# Patient Record
Sex: FEMALE | Race: WHITE | NOT HISPANIC OR LATINO | Employment: UNEMPLOYED | URBAN - METROPOLITAN AREA
[De-identification: names, ages, dates, MRNs, and addresses within clinical notes are randomized per-mention and may not be internally consistent; named-entity substitution may affect disease eponyms.]

---

## 2025-01-11 ENCOUNTER — HOSPITAL ENCOUNTER (OUTPATIENT)
Dept: RADIOLOGY | Facility: HOSPITAL | Age: 40
Discharge: HOME | End: 2025-01-11
Payer: COMMERCIAL

## 2025-01-11 DIAGNOSIS — N93.9 ABNORMAL UTERINE AND VAGINAL BLEEDING, UNSPECIFIED: ICD-10-CM

## 2025-01-11 PROCEDURE — 76856 US EXAM PELVIC COMPLETE: CPT

## 2025-01-11 PROCEDURE — 76856 US EXAM PELVIC COMPLETE: CPT | Performed by: STUDENT IN AN ORGANIZED HEALTH CARE EDUCATION/TRAINING PROGRAM

## 2025-01-11 PROCEDURE — 76830 TRANSVAGINAL US NON-OB: CPT | Performed by: STUDENT IN AN ORGANIZED HEALTH CARE EDUCATION/TRAINING PROGRAM

## 2025-01-30 ENCOUNTER — APPOINTMENT (OUTPATIENT)
Dept: UROLOGY | Facility: CLINIC | Age: 40
End: 2025-01-30
Payer: COMMERCIAL

## 2025-01-30 DIAGNOSIS — N93.9 ABNORMAL UTERINE BLEEDING (AUB): Primary | ICD-10-CM

## 2025-01-30 DIAGNOSIS — Z00.00 HEALTHCARE MAINTENANCE: ICD-10-CM

## 2025-01-30 PROCEDURE — 99204 OFFICE O/P NEW MOD 45 MIN: CPT | Performed by: STUDENT IN AN ORGANIZED HEALTH CARE EDUCATION/TRAINING PROGRAM

## 2025-01-30 PROCEDURE — 87624 HPV HI-RISK TYP POOLED RSLT: CPT

## 2025-01-30 PROCEDURE — 87661 TRICHOMONAS VAGINALIS AMPLIF: CPT

## 2025-01-30 PROCEDURE — 87491 CHLMYD TRACH DNA AMP PROBE: CPT

## 2025-01-30 PROCEDURE — 87591 N.GONORRHOEAE DNA AMP PROB: CPT

## 2025-01-30 NOTE — PROGRESS NOTES
"HISTORY OF PRESENT ILLNESS:  Geni Garsia is a 39 y.o. female who presents today as a new patient. She reports that her menstruals have become closer to 25 days and used to be every 31 to 33 days. She states she has a familial history of fibroids. She does have cramps with her cycle and they have not changed. She does have some more clotting than normal. Her children were born vaginally. She does have some stress incontinence and is mildly bothered by it. She denies any urgency, frequency, pain with intercourse, or feeling of prolapse. She does have some night flashes. Most recent Hb was 8.6 mg, and US showed:     1. Heterogenous and thickened endometrial stripe measuring 2 cm.  Advise further assessment.  2. Heterogenous myometrium without discrete fibroids.         Past Medical History  She has no past medical history on file.    Surgical History  She has a past surgical history that includes Breast lumpectomy (01/04/2018).     Social History  She has no history on file for tobacco use, alcohol use, and drug use.    Family History  No family history on file.     Allergies  Patient has no allergy information on record.      A comprehensive 10+ review of systems was negative except for: see hpi                          PHYSICAL EXAMINATION:  BP Readings from Last 3 Encounters:   09/20/21 108/73   09/02/21 112/80   06/01/21 102/60      Wt Readings from Last 3 Encounters:   10/14/21 58.1 kg (128 lb 1.4 oz)   09/20/21 58.4 kg (128 lb 12 oz)   09/02/21 58.1 kg (128 lb)      BMI: Estimated body mass index is 20.68 kg/m² as calculated from the following:    Height as of 10/14/21: 1.676 m (5' 5.98\").    Weight as of 10/14/21: 58.1 kg (128 lb 1.4 oz).  BSA: Estimated body surface area is 1.64 meters squared as calculated from the following:    Height as of 10/14/21: 1.676 m (5' 5.98\").    Weight as of 10/14/21: 58.1 kg (128 lb 1.4 oz).  HEENT: Normocephalic, atraumatic, PER EOMI, nonicteric, trachea normal, thyroid " normal, oropharynx normal.  CARDIAC: regular rate & rhythm, S1 & S2 normal.  No heaves, thrills, gallops or murmurs.  LUNGS: Clear to auscultation, no spinal or CV tenderness.  EXTREMITIES: No evidence of cyanosis, clubbing or edema.        Pelvic:  Chaperone for pelvic exam:   Genitourinary:  normal external genitalia, Bartholin's glands, [place]'s glands negative,   Urethra normal meatus, non-tender, no periurethral mass  Vaginal mucosa  normal  Cervix  normal  Uterus normal size, nontender  Adnexae  negative nontender, no masses  Atrophy negative    CST negative  Pelvic floor muscle contraction  4/5    POP-Q (in supine position):       Aa -3     Ba -3     C -8              gh 3     pb 3     tvl 3              Ap -3     Bp -3     D -8    Rectal: no hemorrhoids, fissures or masses.    PVR (by ultrasound):          Assessment:  39 y.o. female presents as a new patient with AUB, OUMAR        AUB:   Most likely adenomyosis vs polyp   Has anemia    Plan on D&C  Discussed OCPs vs IUD as well     OUMAR:   Manage expectantly for now     Healthcare maintenance:  Thinprep pap test done 1/30/25      Follow up in 2 months       All questions and concerns were answered and addressed.  The patient expressed understanding and agrees with the plan.     Sheldon Baker MD    Scribe Attestation  By signing my name below, ILaurie, Scribcharlee   attest that this documentation has been prepared under the direction and in the presence of Sheldon Baker MD.

## 2025-01-30 NOTE — LETTER
January 31, 2025     Gil Unger MD  [address] [facility] [address]    Patient: Geni Garsia   YOB: 1985   Date of Visit: 1/30/2025       Dear Dr. Gil Unger MD:    Thank you for referring Geni Garsia to me for evaluation. Below are my notes for this consultation.  If you have questions, please do not hesitate to call me. I look forward to following your patient along with you.       Sincerely,     Sheldon Baker MD      CC: No Recipients  ______________________________________________________________________________________    HISTORY OF PRESENT ILLNESS:  Geni Garsia is a 39 y.o. female who presents today as a new patient. She reports that her menstruals have become closer to 25 days and used to be every 31 to 33 days. She states she has a familial history of fibroids. She does have cramps with her cycle and they have not changed. She does have some more clotting than normal. Her children were born vaginally. She does have some stress incontinence and is mildly bothered by it. She denies any urgency, frequency, pain with intercourse, or feeling of prolapse. She does have some night flashes. Most recent Hb was 8.6 mg, and US showed:     1. Heterogenous and thickened endometrial stripe measuring 2 cm.  Advise further assessment.  2. Heterogenous myometrium without discrete fibroids.         Past Medical History  She has no past medical history on file.    Surgical History  She has a past surgical history that includes Breast lumpectomy (01/04/2018).     Social History  She has no history on file for tobacco use, alcohol use, and drug use.    Family History  No family history on file.     Allergies  Patient has no allergy information on record.      A comprehensive 10+ review of systems was negative except for: see hpi                          PHYSICAL EXAMINATION:  BP Readings from Last 3 Encounters:   09/20/21 108/73   09/02/21 112/80   06/01/21 102/60      Wt Readings from Last 3  "Encounters:   10/14/21 58.1 kg (128 lb 1.4 oz)   09/20/21 58.4 kg (128 lb 12 oz)   09/02/21 58.1 kg (128 lb)      BMI: Estimated body mass index is 20.68 kg/m² as calculated from the following:    Height as of 10/14/21: 1.676 m (5' 5.98\").    Weight as of 10/14/21: 58.1 kg (128 lb 1.4 oz).  BSA: Estimated body surface area is 1.64 meters squared as calculated from the following:    Height as of 10/14/21: 1.676 m (5' 5.98\").    Weight as of 10/14/21: 58.1 kg (128 lb 1.4 oz).  HEENT: Normocephalic, atraumatic, PER EOMI, nonicteric, trachea normal, thyroid normal, oropharynx normal.  CARDIAC: regular rate & rhythm, S1 & S2 normal.  No heaves, thrills, gallops or murmurs.  LUNGS: Clear to auscultation, no spinal or CV tenderness.  EXTREMITIES: No evidence of cyanosis, clubbing or edema.        Pelvic:  Chaperone for pelvic exam:   Genitourinary:  normal external genitalia, Bartholin's glands, [place]'s glands negative,   Urethra normal meatus, non-tender, no periurethral mass  Vaginal mucosa  normal  Cervix  normal  Uterus normal size, nontender  Adnexae  negative nontender, no masses  Atrophy negative    CST negative  Pelvic floor muscle contraction  4/5    POP-Q (in supine position):       Aa -3     Ba -3     C -8              gh 3     pb 3     tvl 3              Ap -3     Bp -3     D -8    Rectal: no hemorrhoids, fissures or masses.    PVR (by ultrasound):          Assessment:  39 y.o. female presents as a new patient with AUB, OUMAR        AUB:   Most likely adenomyosis vs polyp   Has anemia    Plan on D&C  Discussed OCPs vs IUD as well     OUMAR:   Manage expectantly for now     Healthcare maintenance:  Thinprep pap test done 1/30/25      Follow up in 2 months       All questions and concerns were answered and addressed.  The patient expressed understanding and agrees with the plan.     Sheldon Baker MD    Scribe Attestation  By signing my name below, I, Marilyn Padilla   attest that this documentation has been " prepared under the direction and in the presence of Sheldon Baker MD.

## 2025-01-31 PROBLEM — N93.9 ABNORMAL UTERINE BLEEDING (AUB): Status: ACTIVE | Noted: 2025-01-30

## 2025-01-31 RX ORDER — ACETAMINOPHEN 325 MG/1
650 TABLET ORAL ONCE
OUTPATIENT
Start: 2025-01-31 | End: 2025-01-31

## 2025-01-31 RX ORDER — CELECOXIB 400 MG/1
400 CAPSULE ORAL ONCE
OUTPATIENT
Start: 2025-01-31 | End: 2025-01-31

## 2025-02-01 LAB
C TRACH RRNA SPEC QL NAA+PROBE: NEGATIVE
N GONORRHOEA DNA SPEC QL PROBE+SIG AMP: NEGATIVE
T VAGINALIS RRNA SPEC QL NAA+PROBE: NEGATIVE

## 2025-02-06 LAB
CYTOLOGY CMNT CVX/VAG CYTO-IMP: NORMAL
HPV HR 12 DNA GENITAL QL NAA+PROBE: NEGATIVE
HPV HR GENOTYPES PNL CVX NAA+PROBE: NEGATIVE
HPV16 DNA SPEC QL NAA+PROBE: NEGATIVE
HPV18 DNA SPEC QL NAA+PROBE: NEGATIVE
LAB AP HPV GENOTYPE QUESTION: YES
LAB AP HPV HR: NORMAL
LAB AP PAP ADDITIONAL TESTS: NORMAL
LABORATORY COMMENT REPORT: NORMAL
LABORATORY COMMENT REPORT: NORMAL
PATH REPORT.TOTAL CANCER: NORMAL

## 2025-02-11 ENCOUNTER — PRE-ADMISSION TESTING (OUTPATIENT)
Dept: PREADMISSION TESTING | Facility: HOSPITAL | Age: 40
End: 2025-02-11
Payer: COMMERCIAL

## 2025-02-11 VITALS
OXYGEN SATURATION: 98 % | TEMPERATURE: 98.6 F | HEART RATE: 88 BPM | WEIGHT: 138.23 LBS | RESPIRATION RATE: 18 BRPM | SYSTOLIC BLOOD PRESSURE: 120 MMHG | DIASTOLIC BLOOD PRESSURE: 73 MMHG | BODY MASS INDEX: 22.22 KG/M2 | HEIGHT: 66 IN

## 2025-02-11 DIAGNOSIS — N93.9 ABNORMAL UTERINE BLEEDING (AUB): ICD-10-CM

## 2025-02-11 DIAGNOSIS — Z01.818 PRE-OPERATIVE EXAM: Primary | ICD-10-CM

## 2025-02-11 LAB
ANION GAP SERPL CALC-SCNC: 11 MMOL/L (ref 10–20)
BASOPHILS # BLD AUTO: 0.02 X10*3/UL (ref 0–0.1)
BASOPHILS NFR BLD AUTO: 0.3 %
BUN SERPL-MCNC: 12 MG/DL (ref 6–23)
CALCIUM SERPL-MCNC: 9.3 MG/DL (ref 8.6–10.3)
CHLORIDE SERPL-SCNC: 104 MMOL/L (ref 98–107)
CO2 SERPL-SCNC: 27 MMOL/L (ref 21–32)
CREAT SERPL-MCNC: 0.68 MG/DL (ref 0.5–1.05)
EGFRCR SERPLBLD CKD-EPI 2021: >90 ML/MIN/1.73M*2
EOSINOPHIL # BLD AUTO: 0.05 X10*3/UL (ref 0–0.7)
EOSINOPHIL NFR BLD AUTO: 0.8 %
ERYTHROCYTE [DISTWIDTH] IN BLOOD BY AUTOMATED COUNT: 29.4 % (ref 11.5–14.5)
GLUCOSE SERPL-MCNC: 86 MG/DL (ref 74–99)
HCT VFR BLD AUTO: 39.1 % (ref 36–46)
HGB BLD-MCNC: 11.6 G/DL (ref 12–16)
IMM GRANULOCYTES # BLD AUTO: 0.02 X10*3/UL (ref 0–0.7)
IMM GRANULOCYTES NFR BLD AUTO: 0.3 % (ref 0–0.9)
LYMPHOCYTES # BLD AUTO: 1.92 X10*3/UL (ref 1.2–4.8)
LYMPHOCYTES NFR BLD AUTO: 30.3 %
MCH RBC QN AUTO: 22.4 PG (ref 26–34)
MCHC RBC AUTO-ENTMCNC: 29.7 G/DL (ref 32–36)
MCV RBC AUTO: 75 FL (ref 80–100)
MONOCYTES # BLD AUTO: 0.47 X10*3/UL (ref 0.1–1)
MONOCYTES NFR BLD AUTO: 7.4 %
NEUTROPHILS # BLD AUTO: 3.86 X10*3/UL (ref 1.2–7.7)
NEUTROPHILS NFR BLD AUTO: 60.9 %
NRBC BLD-RTO: 0 /100 WBCS (ref 0–0)
OVALOCYTES BLD QL SMEAR: NORMAL
PLATELET # BLD AUTO: 297 X10*3/UL (ref 150–450)
POTASSIUM SERPL-SCNC: 4.4 MMOL/L (ref 3.5–5.3)
RBC # BLD AUTO: 5.19 X10*6/UL (ref 4–5.2)
RBC MORPH BLD: NORMAL
SODIUM SERPL-SCNC: 138 MMOL/L (ref 136–145)
WBC # BLD AUTO: 6.3 X10*3/UL (ref 4.4–11.3)

## 2025-02-11 PROCEDURE — 85025 COMPLETE CBC W/AUTO DIFF WBC: CPT

## 2025-02-11 PROCEDURE — 36415 COLL VENOUS BLD VENIPUNCTURE: CPT

## 2025-02-11 PROCEDURE — 99204 OFFICE O/P NEW MOD 45 MIN: CPT | Performed by: REGISTERED NURSE

## 2025-02-11 PROCEDURE — 80048 BASIC METABOLIC PNL TOTAL CA: CPT

## 2025-02-11 RX ORDER — EVENING PRIMROSE OIL 500 MG
100 CAPSULE ORAL DAILY
COMMUNITY

## 2025-02-11 RX ORDER — MAGNESIUM L-LACTATE 84 MG
84 TABLET, EXTENDED RELEASE ORAL DAILY
COMMUNITY

## 2025-02-11 RX ORDER — ZINC GLUCONATE 50 MG
50 TABLET ORAL DAILY
COMMUNITY

## 2025-02-11 RX ORDER — FERROUS SULFATE 325(65) MG
325 TABLET ORAL
COMMUNITY

## 2025-02-11 RX ORDER — ZINC GLUCONATE 100 MG
100 TABLET ORAL DAILY
COMMUNITY

## 2025-02-11 RX ORDER — MV/FA/DHA/EPA/FISH OIL/SAW/GNK 400MCG-200
500 COMBINATION PACKAGE (EA) ORAL DAILY
COMMUNITY

## 2025-02-11 RX ORDER — BUTYROSPERMUM PARKII(SHEA BUTTER), SIMMONDSIA CHINENSIS (JOJOBA) SEED OIL, ALOE BARBADENSIS LEAF EXTRACT .01; 1; 3.5 G/100G; G/100G; G/100G
250 LIQUID TOPICAL DAILY
COMMUNITY

## 2025-02-11 ASSESSMENT — DUKE ACTIVITY SCORE INDEX (DASI)
CAN YOU RUN A SHORT DISTANCE: YES
CAN YOU PARTICIPATE IN MODERATE RECREATIONAL ACTIVITIES LIKE GOLF, BOWLING, DANCING, DOUBLES TENNIS OR THROWING A BASEBALL OR FOOTBALL: YES
CAN YOU HAVE SEXUAL RELATIONS: YES
CAN YOU PARTICIPATE IN STRENOUS SPORTS LIKE SWIMMING, SINGLES TENNIS, FOOTBALL, BASKETBALL, OR SKIING: YES
CAN YOU WALK INDOORS, SUCH AS AROUND YOUR HOUSE: YES
CAN YOU DO HEAVY WORK AROUND THE HOUSE LIKE SCRUBBING FLOORS OR LIFTING AND MOVING HEAVY FURNITURE: YES
CAN YOU TAKE CARE OF YOURSELF (EAT, DRESS, BATHE, OR USE TOILET): YES
CAN YOU WALK A BLOCK OR TWO ON LEVEL GROUND: YES
CAN YOU CLIMB A FLIGHT OF STAIRS OR WALK UP A HILL: YES
CAN YOU DO MODERATE WORK AROUND THE HOUSE LIKE VACUUMING, SWEEPING FLOORS OR CARRYING GROCERIES: YES
DASI METS SCORE: 9.9
CAN YOU DO LIGHT WORK AROUND THE HOUSE LIKE DUSTING OR WASHING DISHES: YES
CAN YOU DO YARD WORK LIKE RAKING LEAVES, WEEDING OR PUSHING A MOWER: YES
TOTAL_SCORE: 58.2

## 2025-02-11 ASSESSMENT — ENCOUNTER SYMPTOMS
NECK NEGATIVE: 1
NEUROLOGICAL NEGATIVE: 1
EYES NEGATIVE: 1
GASTROINTESTINAL NEGATIVE: 1
MUSCULOSKELETAL NEGATIVE: 1
CARDIOVASCULAR NEGATIVE: 1
CONSTITUTIONAL NEGATIVE: 1
RESPIRATORY NEGATIVE: 1

## 2025-02-11 ASSESSMENT — PAIN SCALES - GENERAL: PAINLEVEL_OUTOF10: 0 - NO PAIN

## 2025-02-11 ASSESSMENT — PAIN - FUNCTIONAL ASSESSMENT: PAIN_FUNCTIONAL_ASSESSMENT: 0-10

## 2025-02-11 ASSESSMENT — LIFESTYLE VARIABLES: SMOKING_STATUS: NONSMOKER

## 2025-02-11 ASSESSMENT — ACTIVITIES OF DAILY LIVING (ADL): ADL_SCORE: 0

## 2025-02-11 NOTE — CPM/PAT H&P
CPM/PAT Evaluation       Name: Geni Garsia (Geni Garsia)  /Age: 1985/ y.o.     In-Person       Chief Complaint: Evaluation prior to surgery    HPI    Past Medical History:   Diagnosis Date    Anemia     Arrhythmia     Dysfunctional uterine bleeding     Fatigue     Nasal septal deviation     Wears contact lenses     Wears glasses        Past Surgical History:   Procedure Laterality Date    BELT ABDOMINOPLASTY      BREAST LUMPECTOMY Right 2018    Breast Surgery Lumpectomy    NASAL SEPTUM SURGERY      WISDOM TOOTH EXTRACTION         Patient  has no history on file for sexual activity.    No family history on file.    No Known Allergies    Prior to Admission medications    Not on File        PAT ROS:   Constitutional:   neg    Neuro/Psych:   neg    Eyes:   neg    Ears:   neg    Nose:   Mouth:   neg    Throat:   Neck:   neg    Cardio:   neg    Respiratory:   neg    Endocrine:   GI:   neg    :    vaginal issues  Musculoskeletal:   neg    Hematologic:   neg    Skin:  neg        Physical Exam  Vitals reviewed.   Constitutional:       Appearance: Normal appearance.   HENT:      Head: Normocephalic and atraumatic.      Mouth/Throat:      Mouth: Mucous membranes are moist.      Pharynx: Oropharynx is clear.   Neck:      Vascular: No carotid bruit.   Cardiovascular:      Rate and Rhythm: Normal rate and regular rhythm.      Pulses: Normal pulses.      Heart sounds: Normal heart sounds.   Pulmonary:      Effort: Pulmonary effort is normal.      Breath sounds: Normal breath sounds.   Abdominal:      Palpations: Abdomen is soft.   Musculoskeletal:         General: Normal range of motion.      Cervical back: Normal range of motion and neck supple.   Skin:     General: Skin is warm and dry.      Capillary Refill: Capillary refill takes less than 2 seconds.   Neurological:      General: No focal deficit present.      Mental Status: She is alert and oriented to person, place, and time.   Psychiatric:         " Mood and Affect: Mood normal.         Behavior: Behavior normal.         Thought Content: Thought content normal.         Judgment: Judgment normal.          PAT AIRWAY:   Airway:     Neck ROM::  Full  normal          Visit Vitals  /73   Pulse 88   Temp 37 °C (98.6 °F)   Resp 18   Ht 1.676 m (5' 6\")   Wt 62.7 kg (138 lb 3.7 oz)   SpO2 98%   BMI 22.31 kg/m²   Smoking Status Never   BSA 1.71 m²       DASI Risk Score      Flowsheet Row Pre-Admission Testing from 2/11/2025 in [facility]   Can you take care of yourself (eat, dress, bathe, or use toilet)?  2.75 filed at 02/11/2025 1138   Can you walk indoors, such as around your house? 1.75 filed at 02/11/2025 1138   Can you walk a block or two on level ground?  2.75 filed at 02/11/2025 1138   Can you climb a flight of stairs or walk up a hill? 5.5 filed at 02/11/2025 1138   Can you run a short distance? 8 filed at 02/11/2025 1138   Can you do light work around the house like dusting or washing dishes? 2.7 filed at 02/11/2025 1138   Can you do moderate work around the house like vacuuming, sweeping floors or carrying groceries? 3.5 filed at 02/11/2025 1138   Can you do heavy work around the house like scrubbing floors or lifting and moving heavy furniture?  8 filed at 02/11/2025 1138   Can you do yard work like raking leaves, weeding or pushing a mower? 4.5 filed at 02/11/2025 1138   Can you have sexual relations? 5.25 filed at 02/11/2025 1138   Can you participate in moderate recreational activities like golf, bowling, dancing, doubles tennis or throwing a baseball or football? 6 filed at 02/11/2025 1138   Can you participate in strenous sports like swimming, singles tennis, football, basketball, or skiing? 7.5 filed at 02/11/2025 1138   DASI SCORE 58.2 filed at 02/11/2025 1138   METS Score (Will be calculated only when all the questions are answered) 9.9 filed at 02/11/2025 1138          Caprini DVT Assessment      Flowsheet Row Pre-Admission " Testing from 2/11/2025 in [facility]   DVT Score (IF A SCORE IS NOT CALCULATING, MUST SELECT A BMI TO COMPLETE) 4 filed at 02/11/2025 1219   Surgical Factors Major surgery planned, including arthroscopic and laproscopic (1-2 hours) filed at 02/11/2025 1219   BMI (BMI MUST BE CHOSEN) 30 or less filed at 02/11/2025 1219          Modified Frailty Index    No data to display       CHADS2 Stroke Risk  Current as of 4 hours ago        N/A 3 to 100%: High Risk   2 to < 3%: Medium Risk   0 to < 2%: Low Risk     Last Change: N/A          This score determines the patient's risk of having a stroke if the patient has atrial fibrillation.        This score is not applicable to this patient. Components are not calculated.          Revised Cardiac Risk Index      Flowsheet Row Pre-Admission Testing from 2/11/2025 in [facility]   High-Risk Surgery (Intraperitoneal, Intrathoracic,Suprainguinal vascular) 0 filed at 02/11/2025 1222   History of ischemic heart disease (History of MI, History of positive exercuse test, Current chest paint considered due to myocardial ischemia, Use of nitrate therapy, ECG with pathological Q Waves) 0 filed at 02/11/2025 1222   History of congestive heart failure (pulmonary edemia, bilateral rales or S3 gallop, Paroxysmal nocturnal dyspnea, CXR showing pulmonary vascular redistribution) 0 filed at 02/11/2025 1222   History of cerebrovascular disease (Prior TIA or stroke) 0 filed at 02/11/2025 1222   Pre-operative insulin treatment 0 filed at 02/11/2025 1222   Pre-operative creatinine>2 mg/dl 0 filed at 02/11/2025 1222   Revised Cardiac Risk Calculator 0 filed at 02/11/2025 1222          Apfel Simplified Score      Flowsheet Row Pre-Admission Testing from 2/11/2025 in [facility]   Smoking status 1 filed at 02/11/2025 1223   History of motion sickness or PONV  0 filed at 02/11/2025 1223   Use of postoperative opioids 1 filed at 02/11/2025 1223   Gender - Female  1=Yes filed at 2025 1223   Apfel Simplified Score Calculator 3 filed at 2025 1223          Risk Analysis Index Results This Encounter         2025  1142             Do you live in a place other than your own home?: 0    When did you begin living in the place you are currently residing?: Greater than one year ago    Any kidney failure, kidney not working well, or seeing a kidney doctor (nephrologist)? If yes, was this for kidney stones or another problem?: 0 No    Any history of chronic (long-term) congestive heart failure (CHF)?: 0 No    Any shortness of breath when resting?: 0 No    In the past five years, have you been diagnosed with or treated for cancer?: No    During the last 3 months has it become difficult for you to remember things or organize your thoughts?: 0 No    Have you lost weight of 10 pounds or more in the past 3 months without trying?: 0 No    Do you have any loss of appetitie?: 0 No    Getting Around (Mobility): 0 Can get around without help    Eatin Can plan and prepare own meals    Toiletin Can use toilet without any help    Personal Hygiene (Bathing, Hand Washing, Changing Clothes): 0 Can shower or bathe without any help    SPAULDING Cancer History: Patient does not indicate history of cancer    Total Risk Analysis Index Score Without Cancer: 10    Total Risk Analysis Index Score: 10          Stop Bang Score      Flowsheet Row Pre-Admission Testing from 2025 in [facility]   Do you snore loudly? 0 filed at 2025 1138   Do you often feel tired or fatigued after your sleep? 1 filed at 2025 1138   Has anyone ever observed you stop breathing in your sleep? 0 filed at 2025 1138   Do you have or are you being treated for high blood pressure? 0 filed at 2025 1138   Recent BMI (Calculated) 20.7 filed at 2025 1138   Is BMI greater than 35 kg/m2? 0=No filed at 2025 1138   Age older than 50 years old? 0=No filed at 2025 1138    Is your neck circumference greater than 17 inches (Male) or 16 inches (Female)? 0 filed at 02/11/2025 1138   Gender - Male 0=No filed at 02/11/2025 1138   STOP-BANG Total Score 1 filed at 02/11/2025 1138          Prodigy: High Risk  Total Score: 0          ARISCAT Score for Postoperative Pulmonary Complications    No data to display       Alex Perioperative Risk for Myocardial Infarction or Cardiac Arrest (ELODIA)    No data to display             Assessment & Plan:    39 y.o.  female  scheduled for HYSTEROSCOPY, WITH UTERINE MYOMECTOMY on 2/26/25 with Dr. Baker for  Abnormal Uterine Bleeding.  She reports that her menstruals have become closer to 25 days and used to be every 31 to 33 days. She states she has a familial history of fibroids. She does have cramps with her cycle and they have not changed. She does have some more clotting than normal. PMHX includes Nasal septal deviation, iron deficiency anemia.  PAT consulted for perioperative risk stratification and optimization    Neuro:  No neurologic diagnosis, however, the patient is at increased risk for perioperative delirium secondary to  age  Patient is at increased risk for perioperative CVA secondary to  increased age    HEENT:  No HEENT diagnosis or significant findings on chart review or clinical presentation and evaluation. No further preoperative testing/intervention indicated at this time.    Cardiovascular:  Hx palpitations worked up with cardiology 2021     METS: 9.9  RCRI: 0 points, 3.9%  risk for postoperative MACE   ELODIA: 0.1% risk for 30 day postoperative MACE  EKG - 9/2/21  Sinus Rhythm  Normal ECG  Rate 80    10/14/21 ECHO  CONCLUSIONS:   1. The left ventricular systolic function is normal with a 60-65% estimated ejection fraction.   2. There is trace to mild tricuspid regurgitation.    9/20/21 Zio Findings:  Patient had a min HR of 48bpm, max HR of 134 bpm, and avg HR of 74 bpm. Predominant underlying rhythm was Sinus Rhythm. Isolated SVEs  were rare (<1.0%) and no SVE couplets of SVE Triplets were present. Isolated VEs were rare (<1.0%), VE couplets were rare (<1.0%), and no VE Triplets were present.   Pulmonary:  No pulmonary diagnosis or significant findings on chart review or clinical presentation.  No further preoperative testing is indicated at this time.    Stop Bang score is 1 placing patient at low risk for BHAVIN  ARISCAT: <26 points, 1.6% risk of in-hospital postoperative pulmonary complication  PRODIGY: Low risk for opioid induced respiratory depression    Pulmonary education discussed. Patient provided deep breathing exercises and educational handout.      Urological/GYN/Renal  Abnormal uterine bleeding    Most recent Hb was 8.6 mg, and US showed:      1. Heterogenous and thickened endometrial stripe measuring 2 cm.  Advise further assessment.  2. Heterogenous myometrium without discrete fibroids.  AUB:   Most likely adenomyosis vs polyp   Has anemia      OUMAR:   Manage expectantly for now    Healthcare maintenance:  Thinprep pap test done 1/30/25    Endocrine:  No endocrine diagnosis or significant findings on chart review or clinical presentation and evaluation. No further testing or intervention is indicated at this time.    Hematologic:  Hx Iron deficiency anemia  Antiplatelet management   The patient is not currently receiving antiplatelet therapy.  Anticoagulation management  The patient is not currently receiving anticoagulation therapy. Patient provided with DVT educational handout.    Caprini Score 4, patient at high risk for perioperative DVT.  Patient provided with VTE education/handout.    Gastrointestinal:   No GI diagnosis or significant findings on chart review or clinical presentation and evaluation.   Eat-10 score 0      Infectious disease:   No infectious diagnosis or significant findings on chart review or clinical presentation and evaluation.       Musculoskeletal:   No diagnosis or significant findings on chart review or  clinical presentation and evaluation.     Anesthesia/Airway:  No anesthesia complications      Medication instructions and NPO guidelines reviewed with the patient.  All questions or concerns discussed and addressed.      Labs ordered   CBC  BMP

## 2025-02-11 NOTE — PREPROCEDURE INSTRUCTIONS
Medication List            Accurate as of February 11, 2025 12:03 PM. Always use your most recent med list.                ferrous sulfate (325 mg ferrous sulfate) tablet  Medication Adjustments for Surgery: Do Not take on the morning of surgery     krill oil 500 mg capsule  Additional Medication Adjustments for Surgery: Take last dose 7 days before surgery     magnesium lactate CR 84 mg ER tablet  Commonly known as: Magtab  Medication Adjustments for Surgery: Do Not take on the morning of surgery     saccharomyces boulardii 250 mg capsule  Commonly known as: Florastor  Additional Medication Adjustments for Surgery: Take last dose 7 days before surgery     vitamin E 45 mg (100 unit) capsule  Additional Medication Adjustments for Surgery: Take last dose 7 days before surgery     vitamin k 100 mcg tablet  Additional Medication Adjustments for Surgery: Take last dose 7 days before surgery     zinc gluconate 50 mg tablet  Additional Medication Adjustments for Surgery: Take last dose 7 days before surgery                                  Thank you for visiting [facility] (PAT) today for your pre-procedure evaluation, you were seen by     JOE Torres  Pre Admission Testing  [facility]  [phone]    This summary includes instructions and information to aid you during your perioperative period.  Please read carefully. If you have any questions about your visit today, please call the number listed above.  If you become ill or have any changes to your health before your surgery, please contact your primary care provider and alert your surgeon.        Preparing for your Surgery       Exercises  Preoperative Deep Breathing Exercises  Why it is important to do deep breathing exercises before my surgery?  Deep breathing exercises strengthen your breathing muscles.  This helps you to recover after your surgery and decreases the chance of breathing complications.  How are the deep  breathing exercises done?  Sit straight with your back supported.  Breathe in deeply and slowly through your nose. Your lower rib cage should expand and your abdomen may move forward.  Hold that breath for 3 to 5 seconds.  Breathe out through pursed lips, slowly and completely.  Rest and repeat 10 times every hour while awake.  Rest longer if you become dizzy or lightheaded.       Preoperative Brain Exercises    What are brain exercises?  A brain exercise is any activity that engages your thinking (cognitive) skills.    What types of activities are considered brain exercises?  Jigsaw puzzles, crossword puzzles, word jumble, memory games, word search, and many more.  Many can be found free online or on your phone via a mobile joon.    Why should I do brain exercises before my surgery?  More recent research has shown brain exercise before surgery can lower the risk of postoperative delirium (confusion) which can be especially important for older adults.  Patients who did brain exercises for 5 to 10 hours the days before surgery, cut their risk of postoperative delirium in half up to 1 week after surgery.    Sit-to-Stand Exercise    What is the sit-to-stand exercise?  The sit-to-stand exercise strengthens the muscles of your lower body and muscles in the center of your body (core muscles for stability) helping to maintain and improve your strength and mobility.  How do I do the sit-to-stand exercise?  The goal is to do this exercise without using your arms or hands.  If this is too difficult, use your arms and hands or a chair with armrests to help slowly push yourself to the standing position and lower yourself back to the sitting position. As the movement becomes easier use your arms and hands less.    Steps to the sit-to-stand exercise  Sit up tall in a sturdy chair, knees bent, feet flat on the floor shoulder-width apart.  Shift your hips/pelvis forward in the chair to correctly position yourself for the next  movement.  Lean forward at your hips.  Stand up straight putting equal weight on both feet.  Check to be sure you are properly aligned with the chair, in a slow controlled movement sit back down.  Repeat this exercise 10-15 times.  If needed you can do it fewer times until your strength improves.  Rest for 1 minute.  Do another 10-15 sit-to-stand exercises.  Try to do this in the morning and evening.        Instructions    Preoperative Fasting Guidelines    Why must I stop eating and drinking near surgery time?  With sedation, food or liquid in your stomach can enter your lungs causing serious complications  Food can increase nausea and vomiting  When do I need to stop eating and drinking before my surgery?      Do not eat any food after midnight the night before your surgery/procedure. You may have up to 13.5 ounces of clear liquid until TWO hours before your instructed arrival time to the hospital.  This includes water, black tea/coffee, (no milk or cream) apple juice, and electrolyte drinks (Gatorade). You may chew gum until TWO hours before your surgery/procedure            Simple things you can do to help prevent blood clots     Blood clots are blockages that can form in the body's veins. When a blood clot forms in your deep veins, it may be called a deep vein thrombosis, or DVT for short. Blood clots can happen in any part of the body where blood flows, but they are most common in the arms and legs. If a piece of a blood clot breaks free and travels to the lungs, it is called a pulmonary embolus (PE). A PE can be a very serious problem.         Being in the hospital or having surgery can raise your chances of getting a blood clot because you may not be well enough to move around as much as you normally do.         Ways you can help prevent blood clots in the hospital       Wearing SCDs  SCDs stands for Sequential Compression Devices.   SCDs are special sleeves that wrap around your legs. They attach to a pump  that fills them with air to gently squeeze your legs every few minutes.  This helps return the blood in your legs to your heart.   SCDs should only be taken off when walking or bathing. SCDs may not be comfortable, but they can help save your life.              Pump SCD leg sleeves  Wearing compression stockings - if your doctor orders them. These special snug-fitting stockings gently squeeze your legs to help blood flow.       Walking. Walking helps move the blood in your legs.   If your doctor says it is ok, try walking the halls at least   5 times a day. Ask us to help you get up, so you don't fall.      Taking any blood-thinning medicines your doctor orders.              Ways you can help prevent blood clots at home         Wearing compression stockings - if your doctor orders them.   Walking - to help move the blood in your legs.    Taking any blood-thinning medicines your doctor orders.      Signs of a blood clot or PE    Tell your doctor or nurse right away if you have any of the problems listed below.         If you are at home, seek medical care right away. Call 911 for chest pain or problems breathing.            Signs of a blood clot (DVT) - such as pain, swelling, redness, or warmth in your arm or legs.  Signs of a pulmonary embolism (PE) - such as chest pain or feeling short of breath      Tobacco and Alcohol;  Do not drink alcohol or smoke within 24 hours of surgery.  It is best to quit smoking for as long as possible before any surgery or procedure.      The Week before Surgery        Seven days before Surgery  Check your PAT medication instructions  Do the exercises provided to you by PAT  Arrange for a responsible, adult licensed  to take you home after surgery and stay with you for 24 hours.  You will not be permitted to drive yourself home if you have received any anesthetic/sedation  Six days before surgery  Check your PAT medication instructions  Do the exercises provided to you by  PAT  Start using Chlorhexidene (CHG) body wash if prescribed  Five days before surgery  Check your PAT medication instructions  Do the exercises provided to you by PAT  Continue to use CHG body wash if prescribed  Three days before surgery  Check your PAT medication instructions  Do the exercises provided to you by PAT  Continue to use CHG body wash if prescribed  Two days before surgery  Check your PAT medication instructions  Do the exercises provided to you by PAT  Continue to use CHG body wash if prescribed    The Day before Surgery       Check your PAT medication and all other PAT instructions including when to stop eating and drinking  You will be called with your arrival time for surgery in the late afternoon.  If you do not receive a call please reach out to [facility] Pre-Op. [phone]  Do not smoke or drink 24 hours before surgery  Prepare items to bring with you to the hospital  Shower with your chlorhexidine wash if prescribed  Brush your teeth and use your chlorhexidine dental rinse if prescribed    The Day of Surgery       Check your PAT medication instructions  Ensure you follow the instructions for when to stop eating and drinking  Shower, if prescribed use CHG.  Do not apply any lotions, creams, moisturizers, perfume or deodorant  Brush your teeth and use your CHG dental rinse if prescribed  Wear loose comfortable clothing  Avoid make-up  Remove  jewelry and piercings, consider professional piercing removal with a plastic spacer if needed  Bring photo ID and Insurance card  Bring an accurate medication list that includes medication dose, frequency and allergies  Bring a copy of your advanced directives (will, health care power of )  Bring any devices and controllers as well as medical devices you have been provided with for surgery (CPAP, slings, braces, etc.)  Dentures, eyeglasses, and contacts will be removed before surgery, please bring cases for contacts or glasses

## 2025-02-12 ENCOUNTER — PREP FOR PROCEDURE (OUTPATIENT)
Dept: UROLOGY | Facility: CLINIC | Age: 40
End: 2025-02-12
Payer: COMMERCIAL

## 2025-02-12 DIAGNOSIS — N93.9 ABNORMAL UTERINE BLEEDING (AUB): Primary | ICD-10-CM

## 2025-02-12 DIAGNOSIS — N94.6 DYSMENORRHEA: ICD-10-CM

## 2025-02-12 RX ORDER — PHENAZOPYRIDINE HYDROCHLORIDE 200 MG/1
200 TABLET, FILM COATED ORAL ONCE
OUTPATIENT
Start: 2025-02-12 | End: 2025-02-12

## 2025-02-12 RX ORDER — CELECOXIB 50 MG/1
200 CAPSULE ORAL ONCE
OUTPATIENT
Start: 2025-02-12 | End: 2025-02-12

## 2025-02-12 RX ORDER — ACETAMINOPHEN 325 MG/1
975 TABLET ORAL ONCE
OUTPATIENT
Start: 2025-02-12 | End: 2025-02-12

## 2025-02-12 RX ORDER — CEFAZOLIN SODIUM 2 G/100ML
2 INJECTION, SOLUTION INTRAVENOUS ONCE
OUTPATIENT
Start: 2025-02-12 | End: 2025-02-12

## 2025-02-25 ENCOUNTER — ANESTHESIA EVENT (OUTPATIENT)
Dept: OPERATING ROOM | Facility: HOSPITAL | Age: 40
End: 2025-02-25
Payer: COMMERCIAL

## 2025-02-25 RX ORDER — MEPERIDINE HYDROCHLORIDE 25 MG/ML
12.5 INJECTION INTRAMUSCULAR; INTRAVENOUS; SUBCUTANEOUS EVERY 10 MIN PRN
Status: CANCELLED | OUTPATIENT
Start: 2025-02-25

## 2025-02-25 RX ORDER — OXYCODONE HYDROCHLORIDE 5 MG/1
5 TABLET ORAL EVERY 4 HOURS PRN
Status: CANCELLED | OUTPATIENT
Start: 2025-02-25

## 2025-02-25 RX ORDER — DIPHENHYDRAMINE HYDROCHLORIDE 50 MG/ML
12.5 INJECTION INTRAMUSCULAR; INTRAVENOUS ONCE AS NEEDED
Status: CANCELLED | OUTPATIENT
Start: 2025-02-25

## 2025-02-25 RX ORDER — ONDANSETRON HYDROCHLORIDE 2 MG/ML
4 INJECTION, SOLUTION INTRAVENOUS ONCE AS NEEDED
Status: CANCELLED | OUTPATIENT
Start: 2025-02-25

## 2025-02-25 RX ORDER — SODIUM CHLORIDE, SODIUM LACTATE, POTASSIUM CHLORIDE, CALCIUM CHLORIDE 600; 310; 30; 20 MG/100ML; MG/100ML; MG/100ML; MG/100ML
100 INJECTION, SOLUTION INTRAVENOUS CONTINUOUS
Status: CANCELLED | OUTPATIENT
Start: 2025-02-25 | End: 2025-02-26

## 2025-02-25 RX ORDER — DROPERIDOL 2.5 MG/ML
0.62 INJECTION, SOLUTION INTRAMUSCULAR; INTRAVENOUS ONCE AS NEEDED
Status: CANCELLED | OUTPATIENT
Start: 2025-02-25

## 2025-02-25 RX ORDER — ALBUTEROL SULFATE 0.83 MG/ML
2.5 SOLUTION RESPIRATORY (INHALATION) ONCE AS NEEDED
Status: CANCELLED | OUTPATIENT
Start: 2025-02-25

## 2025-02-26 ENCOUNTER — PHARMACY VISIT (OUTPATIENT)
Dept: PHARMACY | Facility: CLINIC | Age: 40
End: 2025-02-26
Payer: COMMERCIAL

## 2025-02-26 ENCOUNTER — HOSPITAL ENCOUNTER (OUTPATIENT)
Facility: HOSPITAL | Age: 40
Setting detail: OUTPATIENT SURGERY
Discharge: HOME | End: 2025-02-26
Attending: STUDENT IN AN ORGANIZED HEALTH CARE EDUCATION/TRAINING PROGRAM | Admitting: STUDENT IN AN ORGANIZED HEALTH CARE EDUCATION/TRAINING PROGRAM
Payer: COMMERCIAL

## 2025-02-26 ENCOUNTER — ANESTHESIA (OUTPATIENT)
Dept: OPERATING ROOM | Facility: HOSPITAL | Age: 40
End: 2025-02-26
Payer: COMMERCIAL

## 2025-02-26 VITALS
OXYGEN SATURATION: 100 % | DIASTOLIC BLOOD PRESSURE: 65 MMHG | HEART RATE: 66 BPM | WEIGHT: 134.7 LBS | SYSTOLIC BLOOD PRESSURE: 117 MMHG | TEMPERATURE: 97.9 F | BODY MASS INDEX: 21.65 KG/M2 | HEIGHT: 66 IN | RESPIRATION RATE: 13 BRPM

## 2025-02-26 DIAGNOSIS — N93.9 ABNORMAL UTERINE BLEEDING (AUB): Primary | ICD-10-CM

## 2025-02-26 DIAGNOSIS — N94.6 DYSMENORRHEA: ICD-10-CM

## 2025-02-26 PROBLEM — D64.9 ANEMIA: Status: ACTIVE | Noted: 2025-02-26

## 2025-02-26 LAB — PREGNANCY TEST URINE, POC: NEGATIVE

## 2025-02-26 PROCEDURE — 3600000003 HC OR TIME - INITIAL BASE CHARGE - PROCEDURE LEVEL THREE: Performed by: STUDENT IN AN ORGANIZED HEALTH CARE EDUCATION/TRAINING PROGRAM

## 2025-02-26 PROCEDURE — 7100000002 HC RECOVERY ROOM TIME - EACH INCREMENTAL 1 MINUTE: Performed by: STUDENT IN AN ORGANIZED HEALTH CARE EDUCATION/TRAINING PROGRAM

## 2025-02-26 PROCEDURE — 7100000010 HC PHASE TWO TIME - EACH INCREMENTAL 1 MINUTE: Performed by: STUDENT IN AN ORGANIZED HEALTH CARE EDUCATION/TRAINING PROGRAM

## 2025-02-26 PROCEDURE — 3700000001 HC GENERAL ANESTHESIA TIME - INITIAL BASE CHARGE: Performed by: STUDENT IN AN ORGANIZED HEALTH CARE EDUCATION/TRAINING PROGRAM

## 2025-02-26 PROCEDURE — 2500000004 HC RX 250 GENERAL PHARMACY W/ HCPCS (ALT 636 FOR OP/ED): Performed by: ANESTHESIOLOGY

## 2025-02-26 PROCEDURE — 88305 TISSUE EXAM BY PATHOLOGIST: CPT | Mod: TC,GEALAB | Performed by: STUDENT IN AN ORGANIZED HEALTH CARE EDUCATION/TRAINING PROGRAM

## 2025-02-26 PROCEDURE — 7100000001 HC RECOVERY ROOM TIME - INITIAL BASE CHARGE: Performed by: STUDENT IN AN ORGANIZED HEALTH CARE EDUCATION/TRAINING PROGRAM

## 2025-02-26 PROCEDURE — C1782 MORCELLATOR: HCPCS | Performed by: STUDENT IN AN ORGANIZED HEALTH CARE EDUCATION/TRAINING PROGRAM

## 2025-02-26 PROCEDURE — A58561 PR HYSTEROSCOPY,RMV MYOMA

## 2025-02-26 PROCEDURE — RXMED WILLOW AMBULATORY MEDICATION CHARGE

## 2025-02-26 PROCEDURE — 58558 HYSTEROSCOPY BIOPSY: CPT | Performed by: STUDENT IN AN ORGANIZED HEALTH CARE EDUCATION/TRAINING PROGRAM

## 2025-02-26 PROCEDURE — 3700000002 HC GENERAL ANESTHESIA TIME - EACH INCREMENTAL 1 MINUTE: Performed by: STUDENT IN AN ORGANIZED HEALTH CARE EDUCATION/TRAINING PROGRAM

## 2025-02-26 PROCEDURE — 2720000007 HC OR 272 NO HCPCS: Performed by: STUDENT IN AN ORGANIZED HEALTH CARE EDUCATION/TRAINING PROGRAM

## 2025-02-26 PROCEDURE — 3600000008 HC OR TIME - EACH INCREMENTAL 1 MINUTE - PROCEDURE LEVEL THREE: Performed by: STUDENT IN AN ORGANIZED HEALTH CARE EDUCATION/TRAINING PROGRAM

## 2025-02-26 PROCEDURE — 2500000001 HC RX 250 WO HCPCS SELF ADMINISTERED DRUGS (ALT 637 FOR MEDICARE OP): Performed by: STUDENT IN AN ORGANIZED HEALTH CARE EDUCATION/TRAINING PROGRAM

## 2025-02-26 PROCEDURE — 7100000009 HC PHASE TWO TIME - INITIAL BASE CHARGE: Performed by: STUDENT IN AN ORGANIZED HEALTH CARE EDUCATION/TRAINING PROGRAM

## 2025-02-26 PROCEDURE — 2500000004 HC RX 250 GENERAL PHARMACY W/ HCPCS (ALT 636 FOR OP/ED)

## 2025-02-26 RX ORDER — MIDAZOLAM HYDROCHLORIDE 1 MG/ML
INJECTION INTRAMUSCULAR; INTRAVENOUS AS NEEDED
Status: DISCONTINUED | OUTPATIENT
Start: 2025-02-26 | End: 2025-02-26

## 2025-02-26 RX ORDER — PROPOFOL 10 MG/ML
INJECTION, EMULSION INTRAVENOUS AS NEEDED
Status: DISCONTINUED | OUTPATIENT
Start: 2025-02-26 | End: 2025-02-26

## 2025-02-26 RX ORDER — FENTANYL CITRATE 50 UG/ML
INJECTION, SOLUTION INTRAMUSCULAR; INTRAVENOUS AS NEEDED
Status: DISCONTINUED | OUTPATIENT
Start: 2025-02-26 | End: 2025-02-26

## 2025-02-26 RX ORDER — KETOROLAC TROMETHAMINE 30 MG/ML
INJECTION, SOLUTION INTRAMUSCULAR; INTRAVENOUS AS NEEDED
Status: DISCONTINUED | OUTPATIENT
Start: 2025-02-26 | End: 2025-02-26

## 2025-02-26 RX ORDER — SODIUM CHLORIDE, SODIUM LACTATE, POTASSIUM CHLORIDE, CALCIUM CHLORIDE 600; 310; 30; 20 MG/100ML; MG/100ML; MG/100ML; MG/100ML
20 INJECTION, SOLUTION INTRAVENOUS CONTINUOUS
Status: DISCONTINUED | OUTPATIENT
Start: 2025-02-26 | End: 2025-02-26 | Stop reason: HOSPADM

## 2025-02-26 RX ORDER — PHENYLEPHRINE HCL IN 0.9% NACL 0.4MG/10ML
SYRINGE (ML) INTRAVENOUS AS NEEDED
Status: DISCONTINUED | OUTPATIENT
Start: 2025-02-26 | End: 2025-02-26

## 2025-02-26 RX ORDER — CELECOXIB 400 MG/1
400 CAPSULE ORAL ONCE
Status: COMPLETED | OUTPATIENT
Start: 2025-02-26 | End: 2025-02-26

## 2025-02-26 RX ORDER — GLYCOPYRROLATE 0.2 MG/ML
INJECTION INTRAMUSCULAR; INTRAVENOUS AS NEEDED
Status: DISCONTINUED | OUTPATIENT
Start: 2025-02-26 | End: 2025-02-26

## 2025-02-26 RX ORDER — ACETAMINOPHEN 500 MG
1000 TABLET ORAL EVERY 6 HOURS PRN
Qty: 30 TABLET | Refills: 0 | Status: SHIPPED | OUTPATIENT
Start: 2025-02-26

## 2025-02-26 RX ORDER — ACETAMINOPHEN 325 MG/1
650 TABLET ORAL ONCE
Status: COMPLETED | OUTPATIENT
Start: 2025-02-26 | End: 2025-02-26

## 2025-02-26 RX ORDER — ONDANSETRON HYDROCHLORIDE 2 MG/ML
INJECTION, SOLUTION INTRAVENOUS AS NEEDED
Status: DISCONTINUED | OUTPATIENT
Start: 2025-02-26 | End: 2025-02-26

## 2025-02-26 RX ORDER — CELECOXIB 200 MG/1
200 CAPSULE ORAL 2 TIMES DAILY
Qty: 28 CAPSULE | Refills: 0 | Status: SHIPPED | OUTPATIENT
Start: 2025-02-26 | End: 2025-03-12

## 2025-02-26 RX ORDER — LIDOCAINE HCL/PF 100 MG/5ML
SYRINGE (ML) INTRAVENOUS AS NEEDED
Status: DISCONTINUED | OUTPATIENT
Start: 2025-02-26 | End: 2025-02-26

## 2025-02-26 RX ADMIN — KETOROLAC TROMETHAMINE 30 MG: 30 INJECTION, SOLUTION INTRAMUSCULAR; INTRAVENOUS at 15:00

## 2025-02-26 RX ADMIN — SODIUM CHLORIDE, POTASSIUM CHLORIDE, SODIUM LACTATE AND CALCIUM CHLORIDE 20 ML/HR: 600; 310; 30; 20 INJECTION, SOLUTION INTRAVENOUS at 13:01

## 2025-02-26 RX ADMIN — Medication 80 MCG: at 14:40

## 2025-02-26 RX ADMIN — FENTANYL CITRATE 50 MCG: 50 INJECTION, SOLUTION INTRAMUSCULAR; INTRAVENOUS at 14:43

## 2025-02-26 RX ADMIN — LIDOCAINE HYDROCHLORIDE 60 MG: 20 INJECTION INTRAVENOUS at 14:20

## 2025-02-26 RX ADMIN — Medication 80 MCG: at 14:30

## 2025-02-26 RX ADMIN — ONDANSETRON 4 MG: 2 INJECTION, SOLUTION INTRAMUSCULAR; INTRAVENOUS at 15:00

## 2025-02-26 RX ADMIN — DEXAMETHASONE SODIUM PHOSPHATE 4 MG: 4 INJECTION INTRA-ARTICULAR; INTRALESIONAL; INTRAMUSCULAR; INTRAVENOUS; SOFT TISSUE at 14:30

## 2025-02-26 RX ADMIN — Medication 80 MCG: at 14:24

## 2025-02-26 RX ADMIN — GLYCOPYRROLATE 0.2 MG: 0.2 INJECTION INTRAMUSCULAR; INTRAVENOUS at 14:33

## 2025-02-26 RX ADMIN — Medication 80 MCG: at 14:51

## 2025-02-26 RX ADMIN — PROPOFOL 200 MG: 10 INJECTION, EMULSION INTRAVENOUS at 14:20

## 2025-02-26 RX ADMIN — MIDAZOLAM HYDROCHLORIDE 2 MG: 1 INJECTION, SOLUTION INTRAMUSCULAR; INTRAVENOUS at 14:15

## 2025-02-26 RX ADMIN — CELECOXIB 400 MG: 400 CAPSULE ORAL at 13:01

## 2025-02-26 RX ADMIN — FENTANYL CITRATE 50 MCG: 50 INJECTION, SOLUTION INTRAMUSCULAR; INTRAVENOUS at 14:20

## 2025-02-26 RX ADMIN — ACETAMINOPHEN 650 MG: 325 TABLET ORAL at 13:01

## 2025-02-26 SDOH — HEALTH STABILITY: MENTAL HEALTH: CURRENT SMOKER: 0

## 2025-02-26 ASSESSMENT — PAIN - FUNCTIONAL ASSESSMENT: PAIN_FUNCTIONAL_ASSESSMENT: 0-10

## 2025-02-26 ASSESSMENT — PAIN SCALES - GENERAL
PAINLEVEL_OUTOF10: 0 - NO PAIN

## 2025-02-26 NOTE — OP NOTE
HYSTEROSCOPY, WITH UTERINE MYOMECTOMY Operative Note     Date: 2025  OR Location: [facility] OR    Name: Geni Garsia, : 1985, Age: 39 y.o., MRN: [id number], Sex: female    Diagnosis  Pre-op Diagnosis      * Abnormal uterine bleeding (AUB) [N93.9] Post-op Diagnosis     * Abnormal uterine bleeding (AUB) [N93.9]     Procedures  HYSTEROSCOPY, WITH UTERINE MYOMECTOMY  52282 - MN HYSTEROSCOPY REMOVAL LEIOMYOMATA      Surgeons      * Sheldon Baker - Primary    Resident/Fellow/Other Assistant:  Surgeons and Role:  * No surgeons found with a matching role *    Staff:   Circulator: Laurie Cespedesub Person: Soraya Driver Person: Mikal    Anesthesia Staff: Anesthesiologist: Collin Krishna MD  CRNA: RAKAN Gurrola-CRNA    Procedure Summary  Anesthesia: General  ASA: I  Estimated Blood Loss: 1 mL  Intra-op Medications:   Administrations occurring from 1305 to 1420 on 25:   Medication Name Total Dose   fentaNYL (Sublimaze) injection 50 mcg/mL 50 mcg   lidocaine (cardiac) injection 2% prefilled syringe 60 mg   midazolam PF (Versed) injection 1 mg/mL 2 mg   propofol (Diprivan) injection 10 mg/mL 200 mg              Anesthesia Record               Intraprocedure I/O Totals          Intake    lactated Ringer's infusion 700.00 mL    Total Intake 700 mL          Specimen:   ID Type Source Tests Collected by Time   1 : ENDOMETRIAL CURETTINGS Tissue ENDOMETRIUM CURETTINGS SURGICAL PATHOLOGY EXAM Sheldon Baker MD 2025 1504   2 : UTERINE ENOMETRIAL CURETTINGS Tissue FIBROID HYSTEROSCOPIC RESECTION SURGICAL PATHOLOGY EXAM Sheldon Baker MD 2025 1505                 Drains and/or Catheters: * None in log *    Tourniquet Times:         Implants:     Findings: proliferative endometrium    Indications: Geni Garsia is an 39 y.o. female who is having surgery for Abnormal uterine bleeding (AUB) [N93.9].     The patient was seen in the preoperative area. The risks, benefits, complications, treatment options,  non-operative alternatives, expected recovery and outcomes were discussed with the patient. The possibilities of reaction to medication, pulmonary aspiration, injury to surrounding structures, bleeding, recurrent infection, the need for additional procedures, failure to diagnose a condition, and creating a complication requiring transfusion or operation were discussed with the patient. The patient concurred with the proposed plan, giving informed consent.  The site of surgery was properly noted/marked if necessary per policy. The patient has been actively warmed in preoperative area. Preoperative antibiotics are not indicated. Venous thrombosis prophylaxis are not indicated.    Procedure Details: Patient was taken to the operating room.  Anesthesia was induced without difficulty.  She was then placed in the dorsal lithotomy position, prepped and draped in the normal sterile fashion.       A speculum was placed in the vagina.  The cervix was visualized and the anterior aspect of the cervix was grasped with a single tooth tenaculum and the cervix dilated with Membreno dilators to 19 F.  The speculum was removed and  the  hysteroscope was advanced into the uterine cavity and the above stated findings noted. A myosure was used to remove the majority of the proliferative endometrium and a curettage was performed. All samples were sent to pathology    The tenaculum was removed, hemostasis was observed.     The patient was taken to the OR in stable condition after anesthesia was reversed.    Complications:  None; patient tolerated the procedure well.    Disposition: PACU - hemodynamically stable.  Condition: stable                 Additional Details:     Attending Attestation: I was present and scrubbed for the entire procedure.    Sheldon Baker  Phone Number: [phone]

## 2025-02-26 NOTE — H&P
"History Of Present Illness  Geni Garsia is a 39 y.o. female presenting for hysteroscopy, myomectomy .  She reports that her menstruals have become closer to 25 days and used to be every 31 to 33 days. She states she has a familial history of fibroids. She does have cramps with her cycle and they have not changed. She does have some more clotting than normal. Her children were born vaginally. She does have some stress incontinence and is mildly bothered by it.    Past Medical History  Past Medical History:   Diagnosis Date    Abnormal uterine bleeding     Anemia     Arrhythmia     Dysfunctional uterine bleeding     Fatigue     Nasal septal deviation     Wears contact lenses     Wears glasses        Surgical History  Past Surgical History:   Procedure Laterality Date    BELT ABDOMINOPLASTY      BREAST LUMPECTOMY Right 01/04/2018    Breast Surgery Lumpectomy    NASAL SEPTUM SURGERY      WISDOM TOOTH EXTRACTION          Social History  She reports that she has never smoked. She has never used smokeless tobacco. She reports that she does not drink alcohol and does not use drugs.    Family History  No family history on file.     Allergies  Patient has no known allergies.     Physical Exam  General: no acute distress  HEENT: normocephalic, atraumatic  Heart: warm and well perfused  Lungs: no increased WOB  Abd: soft, nontender  Extremities: moving all extremities    Last Recorded Vitals  Blood pressure 120/71, pulse 84, temperature 36.5 °C (97.7 °F), resp. rate 16, height 1.676 m (5' 6\"), weight 61.1 kg (134 lb 11.2 oz), SpO2 100%.    Relevant Results  Lab Results   Component Value Date    WBC 6.3 02/11/2025    HGB 11.6 (L) 02/11/2025    HCT 39.1 02/11/2025    MCV 75 (L) 02/11/2025     02/11/2025     Lab Results   Component Value Date    GLUCOSE 86 02/11/2025    CALCIUM 9.3 02/11/2025     02/11/2025    K 4.4 02/11/2025    CO2 27 02/11/2025     02/11/2025    BUN 12 02/11/2025    CREATININE 0.68 " 02/11/2025          Assessment/Plan   Geni Garsia is a 39 y.o. female presenting for TLH, colpo-suspension, sling and posterior colporraphy.    - Consent signed  - Has completed PAT and is appropriate for OR  - Pre-procedure medications ordered  - Plan for same-day discharge    Seen and d/w Dr. Samuel Bailon MD PGY2

## 2025-02-26 NOTE — ANESTHESIA PROCEDURE NOTES
Airway  Date/Time: 2/26/2025 2:24 PM  Urgency: elective    Airway not difficult    Staffing  Performed: CRNA   Authorized by: Collin Krishna MD    Performed by: RAKAN Gurrola-PUNEET  Patient location during procedure: OR    Indications and Patient Condition  Indications for airway management: anesthesia and airway protection  Spontaneous Ventilation: absent  Sedation level: deep  Preoxygenated: yes  Patient position: sniffing  MILS maintained throughout  Mask difficulty assessment: 1 - vent by mask  Planned trial extubation    Final Airway Details  Final airway type: supraglottic airway      Successful airway: Supraglottic airway: igel.  Size 4     Number of attempts at approach: 1

## 2025-02-26 NOTE — ANESTHESIA POSTPROCEDURE EVALUATION
Patient: Geni Garsia    Procedure Summary       Date: 02/26/25 Room / Location: [facility] OR 04 / [facility] OR    Anesthesia Start: 1417 Anesthesia Stop: 1514    Procedure: HYSTEROSCOPY, WITH UTERINE MYOMECTOMY Diagnosis:       Abnormal uterine bleeding (AUB)      (Abnormal uterine bleeding (AUB) [N93.9])    Surgeons: Sheldon Baker MD Responsible Provider: Collin Krishna MD    Anesthesia Type: general ASA Status: 1            Anesthesia Type: general    Vitals Value Taken Time   /73 02/26/25 1514   Temp 36.1 02/26/25 1514   Pulse 99 02/26/25 1514   Resp 16 02/26/25 1514   SpO2 100 02/26/25 1514       Anesthesia Post Evaluation    Patient location during evaluation: PACU  Patient participation: complete - patient participated  Level of consciousness: awake and alert  Pain management: satisfactory to patient  Airway patency: patent  Two or more strategies used to mitigate risk of obstructive sleep apnea  Cardiovascular status: acceptable and blood pressure returned to baseline  Respiratory status: acceptable and face mask  Hydration status: acceptable  Postoperative Nausea and Vomiting: none    There were no known notable events for this encounter.

## 2025-02-26 NOTE — ANESTHESIA PREPROCEDURE EVALUATION
Patient: Geni Garsia    Procedure Information       Date/Time: 02/26/25 1330    Procedure: HYSTEROSCOPY, WITH UTERINE MYOMECTOMY - 30 minutes, need myosure  **NEED CARD FOR MYOSURE**    Location: [facility] OR  / Virtual [facility] OR    Surgeons: Sheldon Baker MD            Relevant Problems   Anesthesia (within normal limits)      Cardiac (within normal limits)      Pulmonary (within normal limits)      Neuro (within normal limits)      GI (within normal limits)      /Renal (within normal limits)      Liver (within normal limits)      Endocrine (within normal limits)      Hematology   (+) Anemia      Musculoskeletal (within normal limits)      HEENT (within normal limits)      ID (within normal limits)      Skin (within normal limits)      GYN   (+) Abnormal uterine bleeding (AUB)       Clinical information reviewed:   Tobacco  Allergies  Meds   Med Hx  Surg Hx   Fam Hx  Soc Hx        NPO Detail:  NPO/Void Status  Carbohydrate Drink Given Prior to Surgery? : N  Date of Last Liquid: 02/26/25  Time of Last Liquid: 1030  Date of Last Solid: 02/25/25  Time of Last Solid: 1900  Last Intake Type: Clear fluids  Time of Last Void: 1245         Physical Exam    Airway  Mallampati: I  TM distance: >3 FB  Neck ROM: full     Cardiovascular - normal exam     Dental - normal exam     Pulmonary - normal exam     Abdominal - normal exam         Anesthesia Plan    History of general anesthesia?: yes  History of complications of general anesthesia?: no    ASA 1     general     The patient is not a current smoker.  Patient was not previously instructed to abstain from smoking on day of procedure.  Patient did not smoke on day of procedure.    intravenous induction   Anesthetic plan and risks discussed with patient.  Use of blood products discussed with patient who consented to blood products.

## 2025-03-04 LAB
LABORATORY COMMENT REPORT: NORMAL
PATH REPORT.FINAL DX SPEC: NORMAL
PATH REPORT.GROSS SPEC: NORMAL
PATH REPORT.RELEVANT HX SPEC: NORMAL
PATH REPORT.TOTAL CANCER: NORMAL

## 2025-03-20 ENCOUNTER — APPOINTMENT (OUTPATIENT)
Dept: UROLOGY | Facility: CLINIC | Age: 40
End: 2025-03-20
Payer: COMMERCIAL

## 2025-03-20 DIAGNOSIS — N93.9 ABNORMAL UTERINE BLEEDING (AUB): Primary | ICD-10-CM

## 2025-03-20 PROCEDURE — 99214 OFFICE O/P EST MOD 30 MIN: CPT | Performed by: STUDENT IN AN ORGANIZED HEALTH CARE EDUCATION/TRAINING PROGRAM

## 2025-03-20 NOTE — PROGRESS NOTES
Virtual or Telephone Consent    An interactive audio and video telecommunication system which permits real time communications between the patient (at the originating site) and provider (at the distant site) was utilized to provide this telehealth service.   Verbal consent was requested and obtained from Geni Garsia on this date, 03/20/25 for a telehealth visit and the patient's location was confirmed at the time of the visit.    HISTORY OF PRESENT ILLNESS:  Geni Garsia is a 39 y.o. female who presents today for a virtual follow up visit. She reports that she has her period. She is doing okay post operatively currently. She does still wish to continue with surgery in the fall.           Past Medical History  She has a past medical history of Abnormal uterine bleeding, Anemia, Arrhythmia, Dysfunctional uterine bleeding, Fatigue, Nasal septal deviation, Wears contact lenses, and Wears glasses.    Surgical History  She has a past surgical history that includes Breast lumpectomy (Right, 01/04/2018); [place] tooth extraction; Nasal septum surgery; and Belt abdominoplasty.     Social History  She reports that she has never smoked. She has never used smokeless tobacco. She reports that she does not drink alcohol and does not use drugs.    Family History  No family history on file.     Allergies  Patient has no known allergies.      A comprehensive 10+ review of systems was negative except for: see hpi                  Assessment:  39 y.o. female presents with AUB, OUMAR        AUB:      Has anemia    D&C/hysteroscopy 2/26--> polyp+secretory endometrium  Discussed OCPs vs IUD as well   Scheduled for TLH in October, wants to try some natural remedies  Reassess desire for June    OUMAR:   Manage expectantly for now , can discuss treatment at time of surgery if proceeds with this       Healthcare maintenance:  Thinprep pap test done 1/30/25, normal  Repeat 2030       Follow up in June           All questions and concerns  were answered and addressed.  The patient expressed understanding and agrees with the plan.     Sheldon Baker MD    Scribe Attestation  By signing my name below, I, Laurie Maurer Scribcharlee   attest that this documentation has been prepared under the direction and in the presence of Sheldon Baker MD.

## 2025-04-23 ENCOUNTER — HOSPITAL ENCOUNTER (EMERGENCY)
Facility: HOSPITAL | Age: 40
Discharge: HOME | End: 2025-04-23
Attending: EMERGENCY MEDICINE
Payer: COMMERCIAL

## 2025-04-23 ENCOUNTER — APPOINTMENT (OUTPATIENT)
Dept: CARDIOLOGY | Facility: HOSPITAL | Age: 40
End: 2025-04-23
Payer: COMMERCIAL

## 2025-04-23 VITALS
WEIGHT: 135 LBS | SYSTOLIC BLOOD PRESSURE: 111 MMHG | BODY MASS INDEX: 21.69 KG/M2 | HEIGHT: 66 IN | DIASTOLIC BLOOD PRESSURE: 76 MMHG | OXYGEN SATURATION: 100 % | TEMPERATURE: 97.2 F | RESPIRATION RATE: 20 BRPM | HEART RATE: 78 BPM

## 2025-04-23 DIAGNOSIS — N93.8 DYSFUNCTIONAL UTERINE BLEEDING: Primary | ICD-10-CM

## 2025-04-23 LAB
ABO GROUP (TYPE) IN BLOOD: NORMAL
ALBUMIN SERPL BCP-MCNC: 4.2 G/DL (ref 3.4–5)
ALP SERPL-CCNC: 45 U/L (ref 33–110)
ALT SERPL W P-5'-P-CCNC: 7 U/L (ref 7–45)
ANION GAP SERPL CALC-SCNC: 13 MMOL/L (ref 10–20)
ANTIBODY SCREEN: NORMAL
AST SERPL W P-5'-P-CCNC: 11 U/L (ref 9–39)
ATRIAL RATE: 79 BPM
BASOPHILS # BLD AUTO: 0.03 X10*3/UL (ref 0–0.1)
BASOPHILS NFR BLD AUTO: 0.4 %
BILIRUB SERPL-MCNC: 0.3 MG/DL (ref 0–1.2)
BUN SERPL-MCNC: 15 MG/DL (ref 6–23)
CALCIUM SERPL-MCNC: 8.7 MG/DL (ref 8.6–10.3)
CHLORIDE SERPL-SCNC: 102 MMOL/L (ref 98–107)
CO2 SERPL-SCNC: 25 MMOL/L (ref 21–32)
CREAT SERPL-MCNC: 0.75 MG/DL (ref 0.5–1.05)
EGFRCR SERPLBLD CKD-EPI 2021: >90 ML/MIN/1.73M*2
EOSINOPHIL # BLD AUTO: 0.24 X10*3/UL (ref 0–0.7)
EOSINOPHIL NFR BLD AUTO: 3.5 %
ERYTHROCYTE [DISTWIDTH] IN BLOOD BY AUTOMATED COUNT: 14.7 % (ref 11.5–14.5)
GLUCOSE SERPL-MCNC: 105 MG/DL (ref 74–99)
HCT VFR BLD AUTO: 38.1 % (ref 36–46)
HGB BLD-MCNC: 12.5 G/DL (ref 12–16)
IMM GRANULOCYTES # BLD AUTO: 0.01 X10*3/UL (ref 0–0.7)
IMM GRANULOCYTES NFR BLD AUTO: 0.1 % (ref 0–0.9)
LYMPHOCYTES # BLD AUTO: 3.25 X10*3/UL (ref 1.2–4.8)
LYMPHOCYTES NFR BLD AUTO: 47.9 %
MAGNESIUM SERPL-MCNC: 2.18 MG/DL (ref 1.6–2.4)
MCH RBC QN AUTO: 28.6 PG (ref 26–34)
MCHC RBC AUTO-ENTMCNC: 32.8 G/DL (ref 32–36)
MCV RBC AUTO: 87 FL (ref 80–100)
MONOCYTES # BLD AUTO: 0.41 X10*3/UL (ref 0.1–1)
MONOCYTES NFR BLD AUTO: 6 %
NEUTROPHILS # BLD AUTO: 2.85 X10*3/UL (ref 1.2–7.7)
NEUTROPHILS NFR BLD AUTO: 42.1 %
NRBC BLD-RTO: 0 /100 WBCS (ref 0–0)
P AXIS: 6 DEGREES
P OFFSET: 189 MS
P ONSET: 141 MS
PLATELET # BLD AUTO: 248 X10*3/UL (ref 150–450)
POTASSIUM SERPL-SCNC: 3.6 MMOL/L (ref 3.5–5.3)
PR INTERVAL: 158 MS
PROT SERPL-MCNC: 6.8 G/DL (ref 6.4–8.2)
Q ONSET: 220 MS
QRS COUNT: 13 BEATS
QRS DURATION: 80 MS
QT INTERVAL: 376 MS
QTC CALCULATION(BAZETT): 431 MS
QTC FREDERICIA: 412 MS
R AXIS: 42 DEGREES
RBC # BLD AUTO: 4.37 X10*6/UL (ref 4–5.2)
RH FACTOR (ANTIGEN D): NORMAL
SODIUM SERPL-SCNC: 136 MMOL/L (ref 136–145)
T AXIS: 33 DEGREES
T OFFSET: 408 MS
VENTRICULAR RATE: 79 BPM
WBC # BLD AUTO: 6.8 X10*3/UL (ref 4.4–11.3)

## 2025-04-23 PROCEDURE — 86901 BLOOD TYPING SEROLOGIC RH(D): CPT | Performed by: EMERGENCY MEDICINE

## 2025-04-23 PROCEDURE — 2500000004 HC RX 250 GENERAL PHARMACY W/ HCPCS (ALT 636 FOR OP/ED): Performed by: EMERGENCY MEDICINE

## 2025-04-23 PROCEDURE — 83735 ASSAY OF MAGNESIUM: CPT | Performed by: EMERGENCY MEDICINE

## 2025-04-23 PROCEDURE — 36415 COLL VENOUS BLD VENIPUNCTURE: CPT | Performed by: EMERGENCY MEDICINE

## 2025-04-23 PROCEDURE — 85025 COMPLETE CBC W/AUTO DIFF WBC: CPT | Performed by: EMERGENCY MEDICINE

## 2025-04-23 PROCEDURE — 80053 COMPREHEN METABOLIC PANEL: CPT | Performed by: EMERGENCY MEDICINE

## 2025-04-23 PROCEDURE — 93005 ELECTROCARDIOGRAM TRACING: CPT

## 2025-04-23 PROCEDURE — 96360 HYDRATION IV INFUSION INIT: CPT

## 2025-04-23 PROCEDURE — 99284 EMERGENCY DEPT VISIT MOD MDM: CPT | Performed by: EMERGENCY MEDICINE

## 2025-04-23 RX ORDER — MEDROXYPROGESTERONE ACETATE 5 MG/1
5 TABLET ORAL DAILY
Qty: 5 TABLET | Refills: 0 | Status: SHIPPED | OUTPATIENT
Start: 2025-04-23 | End: 2025-04-28

## 2025-04-23 RX ADMIN — SODIUM CHLORIDE 1000 ML: 9 INJECTION, SOLUTION INTRAVENOUS at 02:49

## 2025-04-23 ASSESSMENT — PAIN SCALES - GENERAL: PAINLEVEL_OUTOF10: 0 - NO PAIN

## 2025-04-23 ASSESSMENT — COLUMBIA-SUICIDE SEVERITY RATING SCALE - C-SSRS
6. HAVE YOU EVER DONE ANYTHING, STARTED TO DO ANYTHING, OR PREPARED TO DO ANYTHING TO END YOUR LIFE?: NO
2. HAVE YOU ACTUALLY HAD ANY THOUGHTS OF KILLING YOURSELF?: NO
1. IN THE PAST MONTH, HAVE YOU WISHED YOU WERE DEAD OR WISHED YOU COULD GO TO SLEEP AND NOT WAKE UP?: NO

## 2025-04-23 ASSESSMENT — PAIN - FUNCTIONAL ASSESSMENT: PAIN_FUNCTIONAL_ASSESSMENT: 0-10

## 2025-04-23 NOTE — ED TRIAGE NOTES
"Pt arrived to ED through triage for complaints of possible anemia. Pt states she has been having an increase in vaginal bleeding. She states that last night she soaked through 3 diapers during the night. Pt states that she has a history of iron deficiency and is concerned about anemia. Pt states she has \"felt off\" and weak.   "

## 2025-04-23 NOTE — ED PROVIDER NOTES
"HPI   Chief Complaint   Patient presents with    Vaginal Bleeding     Pt states she has been having an increase in vaginal bleeding. She states that last night she soaked through 3 diapers during the night. Pt states that she has a history of iron deficiency and is concerned about anemia. Pt states she has \"felt off\" and weak.        39-year-old female here for vaginal bleeding that started last night.  She has soaked through 3 diapers.  She does have a history of this she is scheduled to get a hysterectomy done in October.  She denies any numbness weakness tingling no dizziness or lightheadedness no chest pain but does have some mild shortness of breath.  Checked her hemoglobin at home today and it was 10.3.    She has been taken iron sulfate              Patient History   Medical History[1]  Surgical History[2]  Family History[3]  Social History[4]    Physical Exam   ED Triage Vitals [04/23/25 0216]   Temperature Heart Rate Respirations BP   36.2 °C (97.2 °F) 81 16 152/86      Pulse Ox Temp src Heart Rate Source Patient Position   98 % -- -- --      BP Location FiO2 (%)     -- --       Physical Exam  Vitals and nursing note reviewed.   Constitutional:       Appearance: Normal appearance.   HENT:      Head: Normocephalic and atraumatic.      Nose: Nose normal.      Mouth/Throat:      Mouth: Mucous membranes are moist.   Eyes:      Extraocular Movements: Extraocular movements intact.      Pupils: Pupils are equal, round, and reactive to light.   Cardiovascular:      Rate and Rhythm: Normal rate and regular rhythm.   Pulmonary:      Effort: Pulmonary effort is normal.      Breath sounds: Normal breath sounds.   Abdominal:      General: Abdomen is flat.      Palpations: Abdomen is soft.   Musculoskeletal:         General: Normal range of motion.      Cervical back: Normal range of motion.   Skin:     General: Skin is warm and dry.      Capillary Refill: Capillary refill takes less than 2 seconds.   Neurological:      " General: No focal deficit present.      Mental Status: She is alert.   Psychiatric:         Mood and Affect: Mood normal.           ED Course & MDM   Diagnoses as of 04/23/25 0354   Dysfunctional uterine bleeding                 No data recorded     [place] Coma Scale Score: 15 (04/23/25 0216 : Gemma Hill, VICENTE)                           Medical Decision Making  Medical Decision Making: Patient was worked up lab work is all reassuring.  Normal hemoglobin hematocrit.  EKG interpreted by me shows a heart rate of 79 normal sinus rhythm with normal axis and intervals.  At this time the patient will go home with 5 days of medroxyprogesterone and close follow-up with Dr. Baldwin.  She does agree with the plan of care at this time.  Differential includes pregnancy miscarriage abnormal uterine bleeding  Consider ultrasound the patient declined  [unfilled]     Adelina Franklin D.O.  Emergency Medicine          Procedure  Procedures       [1]   Past Medical History:  Diagnosis Date    Abnormal uterine bleeding     Anemia     Arrhythmia     Dysfunctional uterine bleeding     Fatigue     Nasal septal deviation     Wears contact lenses     Wears glasses    [2]   Past Surgical History:  Procedure Laterality Date    BELT ABDOMINOPLASTY      BREAST LUMPECTOMY Right 01/04/2018    Breast Surgery Lumpectomy    NASAL SEPTUM SURGERY      WISDOM TOOTH EXTRACTION     [3] No family history on file.  [4]   Social History  Tobacco Use    Smoking status: Never    Smokeless tobacco: Never   Substance Use Topics    Alcohol use: Never    Drug use: Never        Adelina Franklin,   04/23/25 0354

## 2025-05-02 LAB
ATRIAL RATE: 79 BPM
P AXIS: 6 DEGREES
P OFFSET: 189 MS
P ONSET: 141 MS
PR INTERVAL: 158 MS
Q ONSET: 220 MS
QRS COUNT: 13 BEATS
QRS DURATION: 80 MS
QT INTERVAL: 376 MS
QTC CALCULATION(BAZETT): 431 MS
QTC FREDERICIA: 412 MS
R AXIS: 42 DEGREES
T AXIS: 33 DEGREES
T OFFSET: 408 MS
VENTRICULAR RATE: 79 BPM

## 2025-08-02 NOTE — PROGRESS NOTES
"HISTORY OF PRESENT ILLNESS:  Geni is a 40 y.o. female who presents today for a follow up visit. She is ready to proceed with surgery. She started glutathione injections this week and uses a bioavailable estrogen  suppository cream and she wants to make sure this is safe to continue.         Past Medical History  She has a past medical history of Abnormal uterine bleeding, Anemia, Arrhythmia, Dysfunctional uterine bleeding, Fatigue, Nasal septal deviation, Wears contact lenses, and Wears glasses.    Surgical History  She has a past surgical history that includes Breast lumpectomy (Right, 01/04/2018); [place] tooth extraction; Nasal septum surgery; and Belt abdominoplasty.     Social History  She reports that she has never smoked. She has never used smokeless tobacco. She reports that she does not drink alcohol and does not use drugs.    Family History  Family History[1]     Allergies  Patient has no known allergies.      A comprehensive 10+ review of systems was negative except for: see hpi                          PHYSICAL EXAMINATION:  BP Readings from Last 3 Encounters:   04/23/25 111/76   02/26/25 117/65   02/11/25 120/73      Wt Readings from Last 3 Encounters:   04/23/25 61.2 kg (135 lb)   02/26/25 61.1 kg (134 lb 11.2 oz)   02/11/25 62.7 kg (138 lb 3.7 oz)      BMI: Estimated body mass index is 21.79 kg/m² as calculated from the following:    Height as of 4/23/25: 1.676 m (5' 6\").    Weight as of 4/23/25: 61.2 kg (135 lb).  BSA: Estimated body surface area is 1.69 meters squared as calculated from the following:    Height as of 4/23/25: 1.676 m (5' 6\").    Weight as of 4/23/25: 61.2 kg (135 lb).  HEENT: Normocephalic, atraumatic, PER EOMI, nonicteric, trachea normal, thyroid normal, oropharynx normal.  CARDIAC: regular rate & rhythm, S1 & S2 normal.  No heaves, thrills, gallops or murmurs.  LUNGS: Clear to auscultation, no spinal or CV tenderness.  EXTREMITIES: No evidence of cyanosis, clubbing or " edema.               Assessment:  40 y.o. with AUB, and iron deficiency anemia       AUB:   -Has anemia    -D&C/hysteroscopy 2/26--> polyp+secretory endometrium  -Discussed OCPs vs IUD as well   -Scheduled for TLH w/ sling on 09/10/2025  -Explained 6 weeks of restrictions with no swimming, bathing, sexual intercourse, or strenuous activity    -Rx Flomax for 3 days pre- and 7 days post-op      Healthcare maintenance:  -ThinPrep pap test done 1/30/25, normal  -Repeat 2030       Follow up 6 weeks after 09/10/2025       All questions and concerns were answered and addressed.  The patient expressed understanding and agrees with the plan.     Sheldon Baker MD    Scribe Attestation  By signing my name below, I, Michael Sanchez, Scribcharlee   attest that this documentation has been prepared under the direction and in the presence of Sheldon Baker MD.         [1]   Family History  Problem Relation Name Age of Onset    Cancer Father Wilfrido     Hypertension Father Wilfrido     Depression Father Wilfrido     Diabetes Father Wilfrido     Stroke Father Wilfrido

## 2025-08-07 ENCOUNTER — APPOINTMENT (OUTPATIENT)
Dept: UROLOGY | Facility: CLINIC | Age: 40
End: 2025-08-07
Payer: COMMERCIAL

## 2025-08-07 DIAGNOSIS — N94.6 DYSMENORRHEA: ICD-10-CM

## 2025-08-07 DIAGNOSIS — N93.9 ABNORMAL UTERINE BLEEDING (AUB): Primary | ICD-10-CM

## 2025-08-07 PROCEDURE — 99214 OFFICE O/P EST MOD 30 MIN: CPT | Performed by: STUDENT IN AN ORGANIZED HEALTH CARE EDUCATION/TRAINING PROGRAM

## 2025-08-27 ENCOUNTER — PRE-ADMISSION TESTING (OUTPATIENT)
Dept: PREADMISSION TESTING | Facility: HOSPITAL | Age: 40
End: 2025-08-27
Payer: COMMERCIAL

## 2025-08-27 VITALS
SYSTOLIC BLOOD PRESSURE: 113 MMHG | TEMPERATURE: 97.9 F | RESPIRATION RATE: 18 BRPM | HEART RATE: 84 BPM | BODY MASS INDEX: 22.75 KG/M2 | OXYGEN SATURATION: 97 % | HEIGHT: 66 IN | WEIGHT: 141.54 LBS | DIASTOLIC BLOOD PRESSURE: 77 MMHG

## 2025-08-27 DIAGNOSIS — N93.9 ABNORMAL UTERINE BLEEDING (AUB): ICD-10-CM

## 2025-08-27 DIAGNOSIS — Z01.818 PREOPERATIVE EXAMINATION: Primary | ICD-10-CM

## 2025-08-27 DIAGNOSIS — N94.6 DYSMENORRHEA: ICD-10-CM

## 2025-08-27 LAB
ABO GROUP (TYPE) IN BLOOD: NORMAL
ALBUMIN SERPL BCP-MCNC: 3.9 G/DL (ref 3.4–5)
ALP SERPL-CCNC: 46 U/L (ref 33–110)
ALT SERPL W P-5'-P-CCNC: 8 U/L (ref 7–45)
ANION GAP SERPL CALC-SCNC: 10 MMOL/L (ref 10–20)
ANTIBODY SCREEN: NORMAL
AST SERPL W P-5'-P-CCNC: 10 U/L (ref 9–39)
BASOPHILS # BLD AUTO: 0.02 X10*3/UL (ref 0–0.1)
BASOPHILS NFR BLD AUTO: 0.3 %
BILIRUB SERPL-MCNC: 0.3 MG/DL (ref 0–1.2)
BUN SERPL-MCNC: 17 MG/DL (ref 6–23)
CALCIUM SERPL-MCNC: 8.4 MG/DL (ref 8.6–10.3)
CHLORIDE SERPL-SCNC: 105 MMOL/L (ref 98–107)
CO2 SERPL-SCNC: 28 MMOL/L (ref 21–32)
CREAT SERPL-MCNC: 0.81 MG/DL (ref 0.5–1.05)
EGFRCR SERPLBLD CKD-EPI 2021: >90 ML/MIN/1.73M*2
EOSINOPHIL # BLD AUTO: 0.09 X10*3/UL (ref 0–0.7)
EOSINOPHIL NFR BLD AUTO: 1.3 %
ERYTHROCYTE [DISTWIDTH] IN BLOOD BY AUTOMATED COUNT: 15.6 % (ref 11.5–14.5)
GLUCOSE SERPL-MCNC: 109 MG/DL (ref 74–99)
HCT VFR BLD AUTO: 39 % (ref 36–46)
HGB BLD-MCNC: 12.6 G/DL (ref 12–16)
IMM GRANULOCYTES # BLD AUTO: 0.02 X10*3/UL (ref 0–0.7)
IMM GRANULOCYTES NFR BLD AUTO: 0.3 % (ref 0–0.9)
LYMPHOCYTES # BLD AUTO: 2.09 X10*3/UL (ref 1.2–4.8)
LYMPHOCYTES NFR BLD AUTO: 30.9 %
MCH RBC QN AUTO: 27.8 PG (ref 26–34)
MCHC RBC AUTO-ENTMCNC: 32.3 G/DL (ref 32–36)
MCV RBC AUTO: 86 FL (ref 80–100)
MONOCYTES # BLD AUTO: 0.45 X10*3/UL (ref 0.1–1)
MONOCYTES NFR BLD AUTO: 6.7 %
NEUTROPHILS # BLD AUTO: 4.09 X10*3/UL (ref 1.2–7.7)
NEUTROPHILS NFR BLD AUTO: 60.5 %
NRBC BLD-RTO: 0 /100 WBCS (ref 0–0)
PLATELET # BLD AUTO: 278 X10*3/UL (ref 150–450)
POTASSIUM SERPL-SCNC: 4.1 MMOL/L (ref 3.5–5.3)
PROT SERPL-MCNC: 6.3 G/DL (ref 6.4–8.2)
RBC # BLD AUTO: 4.53 X10*6/UL (ref 4–5.2)
RH FACTOR (ANTIGEN D): NORMAL
SODIUM SERPL-SCNC: 139 MMOL/L (ref 136–145)
WBC # BLD AUTO: 6.8 X10*3/UL (ref 4.4–11.3)

## 2025-08-27 PROCEDURE — 99204 OFFICE O/P NEW MOD 45 MIN: CPT | Performed by: NURSE PRACTITIONER

## 2025-08-27 PROCEDURE — 86901 BLOOD TYPING SEROLOGIC RH(D): CPT

## 2025-08-27 PROCEDURE — 80053 COMPREHEN METABOLIC PANEL: CPT

## 2025-08-27 PROCEDURE — 36415 COLL VENOUS BLD VENIPUNCTURE: CPT

## 2025-08-27 PROCEDURE — 85025 COMPLETE CBC W/AUTO DIFF WBC: CPT

## 2025-08-27 RX ORDER — CHLORHEXIDINE GLUCONATE 40 MG/ML
1 SOLUTION TOPICAL DAILY
Start: 2025-08-27 | End: 2025-09-01

## 2025-08-27 RX ORDER — LEUCOVORIN CALCIUM 5 MG/1
TABLET ORAL
COMMUNITY

## 2025-08-27 ASSESSMENT — PAIN SCALES - GENERAL: PAINLEVEL_OUTOF10: 0 - NO PAIN

## 2025-08-27 ASSESSMENT — ENCOUNTER SYMPTOMS
EYES NEGATIVE: 1
RESPIRATORY NEGATIVE: 1
CONSTITUTIONAL NEGATIVE: 1
CARDIOVASCULAR NEGATIVE: 1
GASTROINTESTINAL NEGATIVE: 1
NEUROLOGICAL NEGATIVE: 1
MUSCULOSKELETAL NEGATIVE: 1

## 2025-08-27 ASSESSMENT — DUKE ACTIVITY SCORE INDEX (DASI)
CAN YOU RUN A SHORT DISTANCE: YES
CAN YOU CLIMB A FLIGHT OF STAIRS OR WALK UP A HILL: YES
CAN YOU DO YARD WORK LIKE RAKING LEAVES, WEEDING OR PUSHING A MOWER: YES
CAN YOU WALK A BLOCK OR TWO ON LEVEL GROUND: YES
CAN YOU DO LIGHT WORK AROUND THE HOUSE LIKE DUSTING OR WASHING DISHES: YES
DASI METS SCORE: 9.9
CAN YOU DO MODERATE WORK AROUND THE HOUSE LIKE VACUUMING, SWEEPING FLOORS OR CARRYING GROCERIES: YES
CAN YOU PARTICIPATE IN STRENOUS SPORTS LIKE SWIMMING, SINGLES TENNIS, FOOTBALL, BASKETBALL, OR SKIING: YES
CAN YOU TAKE CARE OF YOURSELF (EAT, DRESS, BATHE, OR USE TOILET): YES
CAN YOU WALK INDOORS, SUCH AS AROUND YOUR HOUSE: YES
TOTAL_SCORE: 58.2
CAN YOU HAVE SEXUAL RELATIONS: YES
CAN YOU PARTICIPATE IN MODERATE RECREATIONAL ACTIVITIES LIKE GOLF, BOWLING, DANCING, DOUBLES TENNIS OR THROWING A BASEBALL OR FOOTBALL: YES
CAN YOU DO HEAVY WORK AROUND THE HOUSE LIKE SCRUBBING FLOORS OR LIFTING AND MOVING HEAVY FURNITURE: YES

## 2025-08-27 ASSESSMENT — PAIN - FUNCTIONAL ASSESSMENT: PAIN_FUNCTIONAL_ASSESSMENT: 0-10

## 2025-10-23 ENCOUNTER — APPOINTMENT (OUTPATIENT)
Dept: UROLOGY | Facility: CLINIC | Age: 40
End: 2025-10-23
Payer: COMMERCIAL

## (undated) DEVICE — DEVICE, TISSUE REMOVAL, MYOSURE

## (undated) DEVICE — DRESSING, NON-ADHERENT, TELFA, 3 X 8 IN, NS

## (undated) DEVICE — SEAL, SELF-SEALING, 7 FR, SILICONE

## (undated) DEVICE — Device

## (undated) DEVICE — GOWN, SURGICAL, IMPLT, BACK, DISPOSABLE, XLARGE, STERILE

## (undated) DEVICE — SEAL SET, MYOSURE ROD LENS SCOPE , SINGLE USE